# Patient Record
Sex: FEMALE
[De-identification: names, ages, dates, MRNs, and addresses within clinical notes are randomized per-mention and may not be internally consistent; named-entity substitution may affect disease eponyms.]

---

## 2021-04-27 ENCOUNTER — APPOINTMENT (OUTPATIENT)
Dept: OTOLARYNGOLOGY | Facility: CLINIC | Age: 66
End: 2021-04-27
Payer: MEDICARE

## 2021-04-27 VITALS — WEIGHT: 180 LBS | BODY MASS INDEX: 33.99 KG/M2 | TEMPERATURE: 98 F | HEIGHT: 61 IN

## 2021-04-27 DIAGNOSIS — H93.291 OTHER ABNORMAL AUDITORY PERCEPTIONS, RIGHT EAR: ICD-10-CM

## 2021-04-27 PROBLEM — Z00.00 ENCOUNTER FOR PREVENTIVE HEALTH EXAMINATION: Status: ACTIVE | Noted: 2021-04-27

## 2021-04-27 PROCEDURE — 92557 COMPREHENSIVE HEARING TEST: CPT

## 2021-04-27 PROCEDURE — 92567 TYMPANOMETRY: CPT

## 2021-04-27 PROCEDURE — 99203 OFFICE O/P NEW LOW 30 MIN: CPT

## 2021-04-27 RX ORDER — PREDNISONE 20 MG/1
20 TABLET ORAL DAILY
Qty: 4 | Refills: 0 | Status: ACTIVE | COMMUNITY
Start: 2021-04-27 | End: 1900-01-01

## 2021-04-27 NOTE — PHYSICAL EXAM
[FreeTextEntry1] : \par The patient was alert and oriented and in no distress.\par Voice was clear.\par \par Face:\par The patient had no facial asymmetry or mass.\par The skin was unremarkable.\par \par Eyes:\par The pupils were equal round and reactive to light and accommodation.\par There was no significant nystagmus or disconjugate gaze noted.\par \par Nose: \par The external nose had no significant deformity.  There was no facial tenderness.  On anterior rhinoscopy, the nasal mucosa was clear.  The anterior septum was midline.  There were no visualized polyps purulence  or masses.\par \par Oral cavity:\par The oral mucosa was normal.\par The oral and base of tongue were clear and without mass.\par The gingival and buccal mucosa were moist and without lesions.\par The palate moved well.\par There was no cleft to the palate.\par There appeared to be good salivary flow.  \par There was no pus, erythema or mass in the oral cavity.\par \par \par Ears:\par The external ears were normal without deformity.\par The ear canals were clear.\par The tympanic membranes were intact and normal.\par \par Neck: \par The neck was symmetrical.\par The parotid and submandibular glands were normal without masses.\par The trachea was midline and there was no unusual crepitus.\par The thyroid was smooth and nontender and no masses were palpated.\par There was no significant cervical adenopathy.\par \par \par Neuro:\par Neurologically, the patient was awake, alert, and oriented to person, place and time. There were no obvious focal neurologic abnormalities.  Cranial nerves II through XII were grossly intact.\par \par \par TMJ:\par The temporomandibular joints were nontender.\par There was no abnormal crepitus and no significant malocclusion\par \par  [de-identified] : A complete audiometric evaluation was done and reviewed with the patient and compared to her previous results. This showed mild symmetric high-frequency losses. She had normal tympanograms

## 2021-04-27 NOTE — HISTORY OF PRESENT ILLNESS
[de-identified] : MARCEL NOLASCO is a 65 year old female who comes in complaining of a loud  bothersome nonpulsatile tinnitus that is worse in the right ear than the left.  This started about 3 weeks ago.   She denies otalgia or otorrhea Or acute change in hearing. I had last seen her a little more than 9 years ago with reflux and irritative rhinitis.  These symptoms have been relatively controlled. The patient had no other ear nose or throat complaints at this visit.

## 2021-04-27 NOTE — ASSESSMENT
[FreeTextEntry1] : It was my impression that this was a non-pathologic tinnitus. I explained the etiology and that this most likely will become less and less bothersome over time. Treatment options such as white noise were discussed. Otherwise, I reassured the patient  \par As this was an acute and new onset I suggested 4 days of prednisone 20 mg-risks and benefits especially in view of her history of reflux were discussed.\par I would like to reevaluate in 6 weeks and if she is not improving would consider tinnitus therapy

## 2021-05-07 RX ORDER — AMITRIPTYLINE HYDROCHLORIDE 10 MG/1
10 TABLET, FILM COATED ORAL
Qty: 30 | Refills: 0 | Status: ACTIVE | COMMUNITY
Start: 2021-05-07 | End: 1900-01-01

## 2021-06-09 ENCOUNTER — APPOINTMENT (OUTPATIENT)
Dept: OTOLARYNGOLOGY | Facility: CLINIC | Age: 66
End: 2021-06-09
Payer: MEDICARE

## 2021-06-09 PROCEDURE — 99213 OFFICE O/P EST LOW 20 MIN: CPT | Mod: 25

## 2021-06-09 RX ORDER — FAMOTIDINE 10 MG/1
TABLET, FILM COATED ORAL
Refills: 0 | Status: ACTIVE | COMMUNITY

## 2021-06-09 RX ORDER — SIMVASTATIN 40 MG/1
TABLET, FILM COATED ORAL
Refills: 0 | Status: ACTIVE | COMMUNITY

## 2021-06-09 NOTE — HISTORY OF PRESENT ILLNESS
[de-identified] : MARCEL NOLASCO Was seen in followup on June 9. She still has a tenderness but it is not keeping her up at night and is no longer so bothersome. She had a normal MRI. She denies otalgia or otorrhea.The patient had no other ear nose or throat complaints at this visit.

## 2021-06-09 NOTE — ASSESSMENT
[FreeTextEntry1] : It was my impression that this was a non-pathologic tinnitus. I explained the etiology and that this most likely will become less and less bothersome over time. Treatment options such as white noise were discussed. Otherwise, I reassured the patient and would recommend repeating a hearing test in 6 months or earlier if needed\par She had a normal MRI and I reviewed this with her.

## 2021-06-09 NOTE — CONSULT LETTER
[FreeTextEntry2] : ANTONETTE BATISTA\par  [FreeTextEntry1] : \par \par Dear  Dr. ANTONETTE BATISTA,\par \par I had the pleasure of seeing your patient today.  \par Please see my note below.\par \par \par Thank you very much for allowing me to participate in the care of your patient.\par \par Sincerely,\par \par \par Dereje\par \par \par Manolo Cortez MD\par [facility]\par [facility]\par  [facility]\par \par  [DrYasmin  ___] : Dr. NO

## 2021-12-13 ENCOUNTER — APPOINTMENT (OUTPATIENT)
Dept: OTOLARYNGOLOGY | Facility: CLINIC | Age: 66
End: 2021-12-13
Payer: MEDICARE

## 2021-12-13 VITALS — BODY MASS INDEX: 33.99 KG/M2 | TEMPERATURE: 97 F | WEIGHT: 180 LBS | HEIGHT: 61 IN

## 2021-12-13 DIAGNOSIS — H93.299 OTHER ABNORMAL AUDITORY PERCEPTIONS, UNSPECIFIED EAR: ICD-10-CM

## 2021-12-13 PROCEDURE — 92550 TYMPANOMETRY & REFLEX THRESH: CPT

## 2021-12-13 PROCEDURE — 92557 COMPREHENSIVE HEARING TEST: CPT

## 2021-12-13 PROCEDURE — 99214 OFFICE O/P EST MOD 30 MIN: CPT

## 2022-12-14 NOTE — ASSESSMENT
[FreeTextEntry1] : It was my impression that this was a non-pathologic tinnitus. I explained the etiology and that this most likely will become less and less bothersome over time. Treatment options such as white noise were discussed. Otherwise, I reassured the patient and would recommend repeating a hearing test in the year or earlier if needed\par This has improved over the last few months and her imaging was normal and I reassured her that this is not otherwise of significant pathology.\par \par More than 30  minutes was spent on the patient's care today including review of their chart, and the  history, visit, physical exam,  evaluation of possible diagnoses,  discussion with the patient, ordering evaluations and prescriptions and charting.

## 2022-12-14 NOTE — PHYSICAL EXAM
[de-identified] : Complete audiometric evaluation was ordered and done reviewed with the patient and compared to her previous period showed no significant change in her hearing.\par

## 2022-12-14 NOTE — HISTORY OF PRESENT ILLNESS
[de-identified] : MARCEL NOLASCO Was seen in followup on December 13. She notes that her tinnitus is here but less bothersome. She denies any acute change in her hearing. She comes in for repeat evaluation.

## 2022-12-14 NOTE — ADDENDUM
[FreeTextEntry1] : 12/14/22   MRI report-  standup MRI [place] showed no significant pathology, apparently.

## 2022-12-14 NOTE — CONSULT LETTER
[FreeTextEntry2] : ANTONETTE BATISTA\par  [FreeTextEntry1] : \par \par Dear  Dr. ANTONETTE BATISTA,\par \par I had the pleasure of seeing your patient today.  \par Please see my note below.\par \par \par Thank you very much for allowing me to participate in the care of your patient.\par \par Sincerely,\par \par \par Dereje Cortez\par \par \par Manolo Cortez MD\par [facility]\par [facility]\par  [facility]\par \par

## 2022-12-22 ENCOUNTER — APPOINTMENT (OUTPATIENT)
Dept: OTOLARYNGOLOGY | Facility: CLINIC | Age: 67
End: 2022-12-22

## 2022-12-22 VITALS — HEIGHT: 61 IN | WEIGHT: 180 LBS | TEMPERATURE: 97 F | BODY MASS INDEX: 33.99 KG/M2

## 2022-12-22 PROCEDURE — 31231 NASAL ENDOSCOPY DX: CPT

## 2022-12-22 PROCEDURE — 92557 COMPREHENSIVE HEARING TEST: CPT

## 2022-12-22 PROCEDURE — 99214 OFFICE O/P EST MOD 30 MIN: CPT | Mod: 25

## 2022-12-22 PROCEDURE — 92567 TYMPANOMETRY: CPT

## 2022-12-22 RX ORDER — CEFUROXIME AXETIL 250 MG/1
250 TABLET ORAL
Qty: 20 | Refills: 1 | Status: ACTIVE | COMMUNITY
Start: 2022-12-22 | End: 1900-01-01

## 2022-12-22 NOTE — REVIEW OF SYSTEMS
[Nasal Congestion] : nasal congestion [Sinus Pressure] : sinus pressure [Discolored Nasal Discharge] : discolored nasal discharge

## 2022-12-28 LAB — EAR NOSE AND THROAT CULTURE: NORMAL

## 2022-12-28 NOTE — CONSULT LETTER
[FreeTextEntry2] : ANTONETTE BATISTA\par  [FreeTextEntry1] : \par \par Dear  Dr. ANTONETTE BATISTA,\par \par I had the pleasure of seeing your patient today.  \par Please see my note below.\par \par \par Thank you very much for allowing me to participate in the care of your patient.\par \par Sincerely,\par \par \par Dereje\par \par \par Manolo Cortez MD\par [facility]\par [facility]\par  [facility]\par \par

## 2022-12-28 NOTE — HISTORY OF PRESENT ILLNESS
[de-identified] : MARCEL NOLASCO was seen on December 22.  I had last seen her a year ago.  She still has the tinnitus but it is less bothersome.  She had a stand-up MRI which showed no significant pathology but did show mild bilateral ethmoid and right frontal sinus mucosal thickening on the report.  She comes in complaining that she gets a little bit of imbalance when walking at times.  Rolling over in bed or turning her head from side to side does not cause symptoms.  Additionally, she is complaining of 3 weeks of nasal congestion and a yellow discharge from the nasal cavity.  This is bilateral.  The patient had no other ear nose or throat complaints at this visit.

## 2022-12-28 NOTE — REASON FOR VISIT
[Subsequent Evaluation] : a subsequent evaluation for [Tinnitus] : tinnitus [FreeTextEntry2] : follow up

## 2022-12-28 NOTE — PHYSICAL EXAM
[FreeTextEntry1] : \par The patient was alert and oriented and in no distress.\par Voice was clear.\par \par Face:\par The patient had no facial asymmetry or mass.\par The skin was unremarkable.\par \par Eyes:\par The pupils were equal round and reactive to light and accommodation.\par There was no significant nystagmus or disconjugate gaze noted.\par \par Nose: \par The external nose had no significant deformity.  There was no facial tenderness.  On anterior rhinoscopy, the nasal mucosa was clear.  The anterior septum was midline.  There were no visualized polyps purulence  or masses.\par \par Oral cavity:\par The oral mucosa was normal.\par The oral and base of tongue were clear and without mass.\par The gingival and buccal mucosa were moist and without lesions.\par The palate moved well.\par There was no cleft to the palate.\par There appeared to be good salivary flow.  \par There was no pus, erythema or mass in the oral cavity.\par \par \par Ears:\par The external ears were normal without deformity.\par The ear canals were clear.\par The tympanic membranes were intact and normal.\par \par Neck: \par The neck was symmetrical.\par The parotid and submandibular glands were normal without masses.\par The trachea was midline and there was no unusual crepitus.\par The thyroid was smooth and nontender and no masses were palpated.\par There was no significant cervical adenopathy.\par \par \par Neuro:\par Neurologically, the patient was awake, alert, and oriented to person, place and time. There were no obvious focal neurologic abnormalities.  Cranial nerves II through XII were grossly intact.\par \par Otoneuro:\par No significant nystagmus was noted.\par Finger nose finger and rapid alternating motions were normal.\par Romberg testing was normal.\par Bacova-Hallpike maneuver was negative.\par There was no bruit or thrill in the neck.\par \par \par TMJ:\par The temporomandibular joints were nontender.\par There was no abnormal crepitus and no significant malocclusion\par \par Nasal endoscopy: \par CPT 52330\par Procedure Note:\par \par Endoscopy was done with Covid precautions and with video. All risks and benefits were discussed with the patient and consent obtained.\par \par Nasal endoscopy was done with topical anesthesia of Pontocaine and Afrin and a      nasal endoscope.\par Indication: Nasal congestion, rule out sinusitis.\par Procedure: The nasal cavity was anesthetized with topical Afrin and Pontocaine. An  endoscope was used and inserted into the nasal cavity.\par Attention was first paid to the anterior nasal cavity.\par Endocoscopy was performed to inspect the interior of the nasal cavity, the nasal septum,  the middle and superior meati, the inferior, middle and superior turbinates, and the spheno-ethmoidal  recesses, the nasopharynx and eustachian tube orifices bilaterally. \par All findings were normal except:\par \par The nasal mucosa was boggy and congested and the middle meati were crowded.  I did not see significant purulence but switching to a rigid 0 degree endoscope took a culture of the right middle meatus which was partially obstructed.\par \par A complete audiogram was ordered, done and reviewed with the patient.  This showed normal hearing through 2000 Hz with a sharp drop off above that has normal tympanograms with 100% discrimination\par

## 2022-12-28 NOTE — ASSESSMENT
[FreeTextEntry1] : It was my impression that she has the nonpathologic tinnitus.  I reviewed this with her and suggested either white noise or no other intervention.\par She has nasal congestion and a colored nasal discharge in spite of 3 weeks of fluticasone.  I took a culture but pending the culture results I recommended a course of cefuroxime.  Her MRI report showed bilateral ethmoid and mild right frontal sinus disease.  I recommended continuing with the fluticasone and added hypertonic saline rinses.  I would like to reevaluate in 3 to 4 weeks and if not better would suggest either brief course of prednisone or sinus imaging.\par She has the imbalance which does not seem vertiginous and is more likely related to her lower extremities.  I discussed this with her and the option of physical therapy was discussed but she declined at this time.\par She has the stable hearing loss is a starting in the upper speech frequencies and I suggested repeating the audiogram in a year in any case.

## 2023-01-26 ENCOUNTER — APPOINTMENT (OUTPATIENT)
Dept: OTOLARYNGOLOGY | Facility: CLINIC | Age: 68
End: 2023-01-26
Payer: MEDICARE

## 2023-01-26 VITALS — BODY MASS INDEX: 33.99 KG/M2 | WEIGHT: 180 LBS | HEIGHT: 61 IN

## 2023-01-26 DIAGNOSIS — J32.9 CHRONIC SINUSITIS, UNSPECIFIED: ICD-10-CM

## 2023-01-26 PROCEDURE — 31231 NASAL ENDOSCOPY DX: CPT

## 2023-01-26 PROCEDURE — 99213 OFFICE O/P EST LOW 20 MIN: CPT | Mod: 25

## 2023-01-26 NOTE — HISTORY OF PRESENT ILLNESS
[de-identified] : MARCEL NOLASCO was seen in follow-up on January 26.  She was doing much better.  Her nasal congestion was improved her stuffiness was better her balance was better.  She still has the tinnitus but it is less bothersome.  She comes in for repeat evaluation.

## 2023-01-26 NOTE — PHYSICAL EXAM
[FreeTextEntry1] : \par The patient was alert and oriented and in no distress.\par Voice was clear.\par \par Face:\par The patient had no facial asymmetry or mass.\par The skin was unremarkable.\par \par Eyes:\par The pupils were equal round and reactive to light and accommodation.\par There was no significant nystagmus or disconjugate gaze noted.\par \par Nose: \par The external nose had no significant deformity.  There was no facial tenderness.  On anterior rhinoscopy, the nasal mucosa was clear.  The anterior septum was midline.  There were no visualized polyps purulence  or masses.\par \par Oral cavity:\par The oral mucosa was normal.\par The oral and base of tongue were clear and without mass.\par The gingival and buccal mucosa were moist and without lesions.\par The palate moved well.\par There was no cleft to the palate.\par There appeared to be good salivary flow.  \par There was no pus, erythema or mass in the oral cavity.\par \par \par Ears:\par The external ears were normal without deformity.\par The ear canals were clear.\par The tympanic membranes were intact and normal.\par \par Neck: \par The neck was symmetrical.\par The parotid and submandibular glands were normal without masses.\par The trachea was midline and there was no unusual crepitus.\par The thyroid was smooth and nontender and no masses were palpated.\par There was no significant cervical adenopathy.\par \par \par Neuro:\par Neurologically, the patient was awake, alert, and oriented to person, place and time. There were no obvious focal neurologic abnormalities.  Cranial nerves II through XII were grossly intact.\par \par \par TMJ:\par The temporomandibular joints were nontender.\par There was no abnormal crepitus and no significant malocclusion\par \par Nasal endoscopy: \par CPT 47023\par Procedure Note:\par \par Endoscopy was done with Covid precautions and with video. All risks and benefits were discussed with the patient and consent obtained.\par \par Nasal endoscopy was done with topical anesthesia of Pontocaine and Afrin and a      nasal endoscope.\par Indication: Nasal congestion, rule out sinusitis.\par Procedure: The nasal cavity was anesthetized with topical Afrin and Pontocaine. An  endoscope was used and inserted into the nasal cavity.\par Attention was first paid to the anterior nasal cavity.\par Endocoscopy was performed to inspect the interior of the nasal cavity, the nasal septum,  the middle and superior meati, the inferior, middle and superior turbinates, and the spheno-ethmoidal  recesses, the nasopharynx and eustachian tube orifices bilaterally. \par All findings were normal except:\par The mucosa was much improved although still slightly boggy but the middle meati were patent.  The purulent discharge had resolved and there were no polyps or masses\par

## 2023-01-26 NOTE — ASSESSMENT
[FreeTextEntry1] : It was my impression that she had done quite well.  Her clinical sinusitis had resolved.  Her nasal airway was better and she was less stuffy and her imbalance was better.  She still has the tinnitus but she finds this less bothersome and at this point I reassured her.  I felt she could continue with Flonase if desired.  Otherwise I will see her back in follow-up as needed

## 2024-01-10 ENCOUNTER — APPOINTMENT (OUTPATIENT)
Dept: OTOLARYNGOLOGY | Facility: CLINIC | Age: 69
End: 2024-01-10
Payer: MEDICARE

## 2024-01-10 DIAGNOSIS — J31.0 CHRONIC RHINITIS: ICD-10-CM

## 2024-01-10 DIAGNOSIS — R42 DIZZINESS AND GIDDINESS: ICD-10-CM

## 2024-01-10 DIAGNOSIS — H93.11 TINNITUS, RIGHT EAR: ICD-10-CM

## 2024-01-10 DIAGNOSIS — K21.9 GASTRO-ESOPHAGEAL REFLUX DISEASE W/OUT ESOPHAGITIS: ICD-10-CM

## 2024-01-10 DIAGNOSIS — H90.3 SENSORINEURAL HEARING LOSS, BILATERAL: ICD-10-CM

## 2024-01-10 PROCEDURE — 31231 NASAL ENDOSCOPY DX: CPT

## 2024-01-10 PROCEDURE — 99213 OFFICE O/P EST LOW 20 MIN: CPT | Mod: 25

## 2024-01-10 RX ORDER — FAMOTIDINE 40 MG/1
40 TABLET, FILM COATED ORAL
Qty: 90 | Refills: 2 | Status: ACTIVE | COMMUNITY
Start: 2024-01-10 | End: 1900-01-01

## 2024-01-10 NOTE — PHYSICAL EXAM
[FreeTextEntry1] :  The patient was alert and oriented and in no distress. Voice was clear.  Face: The patient had no facial asymmetry or mass. The skin was unremarkable.  Eyes: The pupils were equal round and reactive to light and accommodation. There was no significant nystagmus or disconjugate gaze noted.  Nose:  The external nose had no significant deformity.  There was no facial tenderness.  On anterior rhinoscopy, the nasal mucosa was clear.  The anterior septum was midline.  There were no visualized polyps purulence  or masses.  Oral cavity: The oral mucosa was normal. The oral and base of tongue were clear and without mass. The gingival and buccal mucosa were moist and without lesions. The palate moved well. There was no cleft to the palate. There appeared to be good salivary flow.   There was no pus, erythema or mass in the oral cavity.   Ears: The external ears were normal without deformity. The ear canals were clear. The tympanic membranes were intact and normal.  Neck:  The neck was symmetrical. The parotid and submandibular glands were normal without masses. The trachea was midline and there was no unusual crepitus. The thyroid was smooth and nontender and no masses were palpated. There was no significant cervical adenopathy.  The area of her discomfort was the right posterior cornu of the thyroid cartilage   Neuro: Neurologically, the patient was awake, alert, and oriented to person, place and time. There were no obvious focal neurologic abnormalities.  Cranial nerves II through XII were grossly intact.   TMJ: The temporomandibular joints were nontender. There was no abnormal crepitus and no significant malocclusion  [de-identified] : Nasal endoscopy:  CPT 16831 Procedure Note:  Endoscopy was done with Covid precautions and with video. All risks and benefits were discussed with the patient and consent obtained.  Nasal endoscopy was done with topical anesthesia of Pontocaine and Afrin and a      nasal endoscope. Indication: Nasal congestion, rule out sinusitis. Procedure: The nasal cavity was anesthetized with topical Afrin and Pontocaine. An  endoscope was used and inserted into the nasal cavity. Attention was first paid to the anterior nasal cavity. Endocoscopy was performed to inspect the interior of the nasal cavity, the nasal septum,  the middle and superior meati, the inferior, middle and superior turbinates, and the spheno-ethmoidal  recesses, the nasopharynx and eustachian tube orifices bilaterally. including the nasal mocosa, the possibility of polyps and the consistency of the nasal mucous. All findings were normal except: The nasal mucosa is boggy with an S-shaped deflection but no polyps purulence or masses  Flexible fiberoptic laryngoscopy: CPT 85132 Indications: Dysphagia Procedure note:   Flexible fiberoptic laryngoscopy was performed because of dysphagia and the patient's inability to tolerate adequate mirror examination. The nasal cavity was anesthetized with Pontocaine plus Afrin.   The nasal cavity was anesthetized with Pontocaine and Afrin. The flexible endoscope was placed into the patient's nasal cavity. The nasopharynx was without masses. The oropharynx, vallecula and base of tongue had no masses. The epiglottis, aryepiglottic folds and false vocal cords were normal. The pyriform sinuses were without mucosal lesions or pooling of secretions.   The laryngeal ventricles were without lesions. The visualized subglottis was without mass. The lateral and posterior pharyngeal walls were clear and symmetrical. The vocal folds were clear and mobile; they abducted and adducted normally. There was no interarytenoid mass or fullness. There was significant posterior laryngeal inflammation consistent with reflux.  Endoscopy was done with Covid precautions and with video. All risks and benefits were discussed with the patient and consent obtained.  There was no evidence of mass

## 2024-01-10 NOTE — CONSULT LETTER
[FreeTextEntry2] : ANTONETTE BATISTA [FreeTextEntry1] :   Dear  Dr. ANTONETTE BATISTA,  I had the pleasure of seeing your patient today.   Please see my note below.   Thank you very much for allowing me to participate in the care of your patient.  Sincerely,  Manolo DOUGLAS [facility]  [facility]

## 2024-01-10 NOTE — HISTORY OF PRESENT ILLNESS
[de-identified] : MARCEL NOLASCO is a 68 year old female who comes in complaining of pain in the right side of her throat since she had her upper GI endoscopy in September.  She had some tenderness there but this is improved.  She tells me her study did show some inflammation and she is taking Pepcid AC.  She has nasal congestion which is improved with fluticasone.  I had seen her previously with the sensorineural hearing loss and the tinnitus with normal imaging.  She still has the tinnitus but it is not that bothersome.  The patient had no other ear nose or throat complaints at this visit.

## 2024-01-10 NOTE — REASON FOR VISIT
[Subsequent Evaluation] : a subsequent evaluation for [Throat Pain] : throat pain [FreeTextEntry2] : throat pain

## 2024-01-10 NOTE — ASSESSMENT
[FreeTextEntry1] : It was my impression that she has the rhinitis and septal deflection which is improved with Flonase.  I felt she could use this regularly if desired but she did not feel it was problematic enough to use over the time She has the discomfort and inflammation of the right greater than left arytenoid from her reflux.  I reviewed this with her and I suggested at least 6 weeks of taking famotidine 40 mg instead of the 20 mg dose.  I wanted to see her back in follow-up if the symptoms fail to resolve She still has the tinnitus but it is not that bothersome and I did not suggest other intervention.  She felt that her hearing is stable and was not interested in repeating her hearing test.  Her IAC imaging in the past was normal

## 2025-03-26 ENCOUNTER — APPOINTMENT (OUTPATIENT)
Dept: OTOLARYNGOLOGY | Facility: CLINIC | Age: 70
End: 2025-03-26
Payer: MEDICARE

## 2025-03-26 DIAGNOSIS — H90.3 SENSORINEURAL HEARING LOSS, BILATERAL: ICD-10-CM

## 2025-03-26 DIAGNOSIS — K21.9 GASTRO-ESOPHAGEAL REFLUX DISEASE W/OUT ESOPHAGITIS: ICD-10-CM

## 2025-03-26 DIAGNOSIS — H93.11 TINNITUS, RIGHT EAR: ICD-10-CM

## 2025-03-26 DIAGNOSIS — H61.20 IMPACTED CERUMEN, UNSPECIFIED EAR: ICD-10-CM

## 2025-03-26 DIAGNOSIS — J31.0 CHRONIC RHINITIS: ICD-10-CM

## 2025-03-26 PROCEDURE — 31231 NASAL ENDOSCOPY DX: CPT

## 2025-03-26 PROCEDURE — 69210 REMOVE IMPACTED EAR WAX UNI: CPT

## 2025-03-26 PROCEDURE — 99213 OFFICE O/P EST LOW 20 MIN: CPT | Mod: 25
